# Patient Record
Sex: FEMALE | Race: WHITE | NOT HISPANIC OR LATINO | URBAN - METROPOLITAN AREA
[De-identification: names, ages, dates, MRNs, and addresses within clinical notes are randomized per-mention and may not be internally consistent; named-entity substitution may affect disease eponyms.]

---

## 2017-03-30 NOTE — PROCEDURE NOTE: CLINICAL
PROCEDURE NOTE: Laser for RD OS. Diagnosis: Rhegmatogenous Retinal Detachment. Prior to laser, risks/benefits/alternatives to laser discussed including loss of vision, decreased peripheral and night vision, need for more laser and/or surgery and patient wished to proceed. A written consent is on file, and the need for today’s laser was discussed and the patient is understanding and wishes to proceed. Spot size: 200 um. Pulse power: 200-400 mW. Pulse duration: 100 ms. Number [id number]. Procedure Time: 12:15PM. Patient tolerated procedure well. There were no complications. Post-op instructions given. Patient given office phone number/answering service number and advised to call immediately should there be loss of vision or pain, or should they have any other questions or concerns. Sergey Douglas

## 2019-12-16 NOTE — PATIENT DISCUSSION
PROM EDEMA - CONT DROPS - ONGOING - PT WANTS TO HOLD ON MORE AGGRESSIVE TX AS IT IS NOT LIKELY TO MAKE A FUNCTIONAL DIFFICULTY.

## 2021-04-19 ENCOUNTER — PREPPED CHART (OUTPATIENT)
Dept: URBAN - METROPOLITAN AREA CLINIC 16 | Facility: CLINIC | Age: 41
End: 2021-04-19

## 2021-05-06 NOTE — PATIENT DISCUSSION
5 6 21 INCREASED EDEMA OFF PF - NOW MODERATE - IOP IMPROVED OF PF - DISCUSSED OPTION OF RESUMING PF/STEROIDS BUT THAT WILL LIKELY INCREASE IOP AND AS [facility] LIMITED PT HAS DECIDED NOT TO RESUME TX OTHER THAN KETOROLAC.

## 2021-05-06 NOTE — PATIENT DISCUSSION
5 6 21 RNFL LOSS OS.  IOP IMPROVED OS - TO STOP T 1/2 - TO GET SUN TO CHECK IOP IN 1 WK ON SANIBEL - IF GOOD HE IS GOING TO CONTINUE DORZOLAMIDE.

## 2022-04-19 ASSESSMENT — TONOMETRY
OS_IOP_MMHG: 15
OD_IOP_MMHG: 16

## 2022-04-25 ENCOUNTER — COMPREHENSIVE EXAM (OUTPATIENT)
Dept: URBAN - METROPOLITAN AREA CLINIC 16 | Facility: CLINIC | Age: 42
End: 2022-04-25

## 2022-04-25 DIAGNOSIS — H52.223: ICD-10-CM

## 2022-04-25 DIAGNOSIS — Z01.00: ICD-10-CM

## 2022-04-25 DIAGNOSIS — H52.03: ICD-10-CM

## 2022-04-25 PROCEDURE — 92015 DETERMINE REFRACTIVE STATE: CPT

## 2022-04-25 PROCEDURE — 92014 COMPRE OPH EXAM EST PT 1/>: CPT

## 2022-04-25 ASSESSMENT — KERATOMETRY
OS_AXISANGLE_DEGREES: 26
OS_AXISANGLE2_DEGREES: 116
OD_AXISANGLE2_DEGREES: 83
OD_K2POWER_DIOPTERS: 46.25
OD_AXISANGLE_DEGREES: 173
OS_K2POWER_DIOPTERS: 45.75
OD_K1POWER_DIOPTERS: 44.50
OS_K1POWER_DIOPTERS: 45.00

## 2022-04-25 ASSESSMENT — TONOMETRY
OS_IOP_MMHG: 14
OD_IOP_MMHG: 14

## 2022-04-25 ASSESSMENT — VISUAL ACUITY
OD_SC: 20/30-1
OS_SC: 20/25+1

## 2022-06-29 RX ORDER — LEVOTHYROXINE SODIUM 88 UG/1
TABLET ORAL
COMMUNITY

## 2023-08-21 ENCOUNTER — EMERGENCY VISIT (OUTPATIENT)
Dept: URBAN - METROPOLITAN AREA CLINIC 16 | Facility: CLINIC | Age: 43
End: 2023-08-21

## 2023-08-21 DIAGNOSIS — H57.89: ICD-10-CM

## 2023-08-21 PROCEDURE — 99212 OFFICE O/P EST SF 10 MIN: CPT

## 2023-08-21 ASSESSMENT — KERATOMETRY
OS_K1POWER_DIOPTERS: 45.00
OD_AXISANGLE_DEGREES: 173
OS_K2POWER_DIOPTERS: 45.75
OS_AXISANGLE2_DEGREES: 116
OD_K1POWER_DIOPTERS: 44.50
OS_AXISANGLE_DEGREES: 26
OD_AXISANGLE2_DEGREES: 83
OD_K2POWER_DIOPTERS: 46.25

## 2023-08-21 ASSESSMENT — TONOMETRY
OD_IOP_MMHG: 16
OS_IOP_MMHG: 16

## 2023-08-21 ASSESSMENT — VISUAL ACUITY
OS_CC: 20/20-1
OD_CC: 20/20

## 2023-10-05 ASSESSMENT — KERATOMETRY
OS_K1POWER_DIOPTERS: 45.00
OS_AXISANGLE2_DEGREES: 116
OS_K2POWER_DIOPTERS: 45.75
OS_AXISANGLE_DEGREES: 26
OD_K2POWER_DIOPTERS: 46.25
OD_K1POWER_DIOPTERS: 44.50
OD_AXISANGLE2_DEGREES: 83
OD_AXISANGLE_DEGREES: 173

## 2023-10-09 ENCOUNTER — COMPREHENSIVE EXAM (OUTPATIENT)
Dept: URBAN - METROPOLITAN AREA CLINIC 16 | Facility: CLINIC | Age: 43
End: 2023-10-09

## 2023-10-09 DIAGNOSIS — H52.223: ICD-10-CM

## 2023-10-09 DIAGNOSIS — Z01.00: ICD-10-CM

## 2023-10-09 DIAGNOSIS — H52.03: ICD-10-CM

## 2023-10-09 PROCEDURE — 92310C CONTACT LENS 75

## 2023-10-09 PROCEDURE — 92014 COMPRE OPH EXAM EST PT 1/>: CPT

## 2023-10-09 PROCEDURE — 92015 DETERMINE REFRACTIVE STATE: CPT

## 2023-10-09 ASSESSMENT — VISUAL ACUITY
OD_SC: 20/40-1
OS_SC: 20/20

## 2023-10-09 ASSESSMENT — TONOMETRY
OS_IOP_MMHG: 15
OD_IOP_MMHG: 16

## 2024-10-15 ENCOUNTER — COMPREHENSIVE EXAM (OUTPATIENT)
Dept: URBAN - METROPOLITAN AREA CLINIC 16 | Facility: CLINIC | Age: 44
End: 2024-10-15

## 2024-10-15 DIAGNOSIS — Z01.00: ICD-10-CM

## 2024-10-15 DIAGNOSIS — H52.223: ICD-10-CM

## 2024-10-15 DIAGNOSIS — H52.03: ICD-10-CM

## 2024-10-15 PROCEDURE — 92014 COMPRE OPH EXAM EST PT 1/>: CPT

## 2024-10-15 PROCEDURE — 92015 DETERMINE REFRACTIVE STATE: CPT
